# Patient Record
(demographics unavailable — no encounter records)

---

## 2025-03-04 NOTE — PHYSICAL EXAM
[Non-Tender] : non-tender [Smooth] : smooth [General Appearance - Alert] : alert [General Appearance - In No Acute Distress] : in no acute distress [General Appearance - Well Nourished] : well nourished [General Appearance - Well Developed] : well developed [General Appearance - Well-Appearing] : healthy appearing [Sclera] : the sclera and conjunctiva were normal [Hearing Threshold Finger Rub Not [place]] : hearing was normal [Oropharynx] : the oropharynx was normal [Neck Appearance] : the appearance of the neck was normal [Neck Cervical Mass (___cm)] : no neck mass was observed [Jugular Venous Distention Increased] : there was no jugular-venous distention [Respiration, Rhythm And Depth] : normal respiratory rhythm and effort [Exaggerated Use Of Accessory Muscles For Inspiration] : no accessory muscle use [Auscultation Breath Sounds / Voice Sounds] : lungs were clear to auscultation bilaterally [Heart Rate And Rhythm] : heart rate was normal and rhythm regular [Heart Sounds] : normal S1 and S2 [Murmurs] : no murmurs [Edema] : there was no peripheral edema [Bowel Sounds] : normal bowel sounds [Abdomen Soft] : soft [Abdomen Tenderness] : non-tender [Abdomen Mass (___ Cm)] : no abdominal mass palpated [Abnormal Walk] : normal gait [Nail Clubbing] : no clubbing  or cyanosis of the fingernails [Involuntary Movements] : no involuntary movements were seen [Skin Color & Pigmentation] : normal skin color and pigmentation [Skin Turgor] : normal skin turgor [] : no rash [Oriented To Time, Place, And Person] : oriented to person, place, and time [Impaired Insight] : insight and judgment were intact [Affect] : the affect was normal [Mood] : the mood was normal [Memory Recent] : recent memory was not impaired [Memory Remote] : remote memory was not impaired [Scleral Icterus] : No Scleral Icterus [Spider Angioma] : No spider angioma(s) were observed [Abdominal  Ascites] : no ascites [Splenomegaly] : no splenomegaly [Caput Medusae] : no caput medusae observed [Asterixis] : no asterixis observed [Jaundice] : No jaundice [Palmar Erythema] : no Palmar Erythema [FreeTextEntry1] : +mild central adiposity

## 2025-03-04 NOTE — HISTORY OF PRESENT ILLNESS
[de-identified] : Ms. Will is a 56 yo Yi-speaking F with osteoporosis (on Prolia q6 months) and PBC (first diagnosed in 2016 based on abnormal liver chemistries and positive AMA, with no prior liver biopsy), who is being seen for follow-up. She has been on ursodiol 500 mg po bid since 9/2016. Her symptoms include fatigue as well as pruritus, the latter partially improved after she started ursodiol. She previously underwent US elastography (1/23/17) that was concerning for advanced hepatic fibrosis, with median hepatic stiffness 1.79 m/s, but subsequent FibroScans (most recently on 7/5/24) have all suggested F0-1 fibrosis. She has no definite history of cirrhosis, portal hypertension, or overt hepatic decompensations.  FibroScan (7/5/24): Median liver stiffness 8.6 kPA (consistent with F1 fibrosis) and CAP score 237 dB/m (consistent with S0 steatosis).  She was last seen in this office on 1/22/24 and presents today for routine follow up and to discuss her recent FibroScan results (above). She is accompanied by her son who is assisting with translating, per Ms. Will's preference. She reports feeling well no new health complaints. She reports adherence with ursodiol 500 mg po bid and continues to tolerate it well. She is planning to visit [place] this summer with her daughter.   She is  and lives at home with her . They have 4 grown children, including a daughter who is an anesthesiologist at [employer] (after recently completing anesthesiology residency in 6/2024) and a son who is now a second-year resident in family medicine at [employer]. Her daughter recently  an IR resident in 9/2023. Her  is a , and their other two children are civil engineers. No tobacco, alcohol, or illicit drugs. They are originally from [place].  [FreeTextEntry1] : Ms. Will is a very pleasant 57 yo Romanian-speaking F with osteoporosis (on Prolia q6 months) and PBC (first diagnosed in 2016 based on abnormal liver chemistries and positive AMA, with no prior liver biopsy), who is being seen for follow-up. She has been on ursodiol 500 mg po bid since 9/2016. She previously underwent US elastography (1/23/17) that was concerning for advanced hepatic fibrosis, with median hepatic stiffness 1.79 m/s, but subsequent FibroScans (most recently on 7/5/24) have all suggested F0-1 fibrosis. She has no definite history of cirrhosis, portal hypertension, or overt hepatic decompensations.  FibroScan (7/5/24): Median liver stiffness 8.6 kPA (consistent with F1 fibrosis) and CAP score 237 dB/m (consistent with S0 steatosis).  She was last seen in this office on 7/8/24 and presents today for routine follow up. She is accompanied by her son who is assisting with translating, per Ms. Will's preference. She reports feeling well no new health complaints, though fatigued at times and also with occasional nighttime pruritus. She is awakened sometimes by the pruritus and sometimes by a need to urinate, though this is not nightly. She is not interested in pharmacologic therapy for pruritus yet. She has noticed dry mouth and increased thirst. No dry eyes. She reports adherence with ursodiol 500 mg po bid and continues to tolerate it well. She is planning to do labs today in our office.   She is  and lives at home with her . They have 4 grown children, including a daughter who is an anesthesiologist at [employer], son-in-law who is an IR resident, and a son who is now a second-year resident in family medicine at [employer]. Her  is a , and their other two children are civil engineers (including her son who is here today). No tobacco, alcohol, or illicit drugs. They are originally from [place].

## 2025-03-04 NOTE — HISTORY OF PRESENT ILLNESS
[de-identified] : Ms. Will is a 58 yo Mongolian-speaking F with osteoporosis (on Prolia q6 months) and PBC (first diagnosed in 2016 based on abnormal liver chemistries and positive AMA, with no prior liver biopsy), who is being seen for follow-up. She has been on ursodiol 500 mg po bid since 9/2016. Her symptoms include fatigue as well as pruritus, the latter partially improved after she started ursodiol. She previously underwent US elastography (1/23/17) that was concerning for advanced hepatic fibrosis, with median hepatic stiffness 1.79 m/s, but subsequent FibroScans (most recently on 7/5/24) have all suggested F0-1 fibrosis. She has no definite history of cirrhosis, portal hypertension, or overt hepatic decompensations.  FibroScan (7/5/24): Median liver stiffness 8.6 kPA (consistent with F1 fibrosis) and CAP score 237 dB/m (consistent with S0 steatosis).  She was last seen in this office on 1/22/24 and presents today for routine follow up and to discuss her recent FibroScan results (above). She is accompanied by her son who is assisting with translating, per Ms. Will's preference. She reports feeling well no new health complaints. She reports adherence with ursodiol 500 mg po bid and continues to tolerate it well. She is planning to visit [place] this summer with her daughter.   She is  and lives at home with her . They have 4 grown children, including a daughter who is an anesthesiologist at [employer] (after recently completing anesthesiology residency in 6/2024) and a son who is now a second-year resident in family medicine at [employer]. Her daughter recently  an IR resident in 9/2023. Her  is a , and their other two children are civil engineers. No tobacco, alcohol, or illicit drugs. They are originally from [place].  [FreeTextEntry1] : Ms. Will is a very pleasant 57 yo Setsw[place]-speaking F with osteoporosis (on Prolia q6 months) and PBC (first diagnosed in 2016 based on abnormal liver chemistries and positive AMA, with no prior liver biopsy), who is being seen for follow-up. She has been on ursodiol 500 mg po bid since 9/2016. She previously underwent US elastography (1/23/17) that was concerning for advanced hepatic fibrosis, with median hepatic stiffness 1.79 m/s, but subsequent FibroScans (most recently on 7/5/24) have all suggested F0-1 fibrosis. She has no definite history of cirrhosis, portal hypertension, or overt hepatic decompensations.  FibroScan (7/5/24): Median liver stiffness 8.6 kPA (consistent with F1 fibrosis) and CAP score 237 dB/m (consistent with S0 steatosis).  She was last seen in this office on 7/8/24 and presents today for routine follow up. She is accompanied by her son who is assisting with translating, per Ms. Will's preference. She reports feeling well no new health complaints, though fatigued at times and also with occasional nighttime pruritus. She is awakened sometimes by the pruritus and sometimes by a need to urinate, though this is not nightly. She is not interested in pharmacologic therapy for pruritus yet. She has noticed dry mouth and increased thirst. No dry eyes. She reports adherence with ursodiol 500 mg po bid and continues to tolerate it well. She is planning to do labs today in our office.   She is  and lives at home with her . They have 4 grown children, including a daughter who is an anesthesiologist at [employer], son-in-law who is an IR resident, and a son who is now a second-year resident in family medicine at [employer]. Her  is a , and their other two children are civil engineers (including her son who is here today). No tobacco, alcohol, or illicit drugs. They are originally from [place].

## 2025-03-04 NOTE — ASSESSMENT
[FreeTextEntry1] : # Primary biliary cholangitis (PBC): - Based on her most recent labs (7/2024), she has mild residual elevations in ALP and GGT that have not normalized despite long-term ursodiol therapy. We will re-check labs today for trends and monitor every 6 months. - We had previously opted to defer obeticholic acid therapy given that her cholestatic liver enzyme elevations are only mild and given the potential risk for worsening pruritus. Today, we discussed the possibility of adding a newer PBC medication (i.e., seladelpar or elafibrinor) that may, in combination with ursodiol, better control her PBC activity and potentially lead to normalization of her liver enzymes and lower long-term risks of cirrhosis or liver cancer. We discussed potential side effects and harms of these alternatives. She wishes to further discuss these medications with her family members who are physicians before deciding whether or not to start one (if also approved by insurance if/once ordered).  - Continue ursodiol 500 mg po bid (appropriate weight-based dose). - Given prior variable US elastography versus FibroScan results, MR elastography was previously ordered but was not approved by her insurance. Her most recent FibroScan (7/5/24) again suggests only mild (F1) fibrosis, overall stable from prior. We discussed deferring a liver biopsy at this time for definitive staging as it is unlikely to change her current pharmacologic management. We will plan to repeat FibroScan annually (due with next visit). - There was prior concern for a 6 mm hypervascular segment 4A nodule on prior MRI (1/16/23), LR-3 (indeterminate). However, follow up MRI (5/24/23) did not show any hypervascular nodule. She is therefore back on routine q6 month screening for hepatocellular carcinoma (HCC) with US and AFP. Her most recent ultrasound (11/2/24) did not show any focal hepatic lesion, though was again read as concerning for cirrhotic liver morphology. We have discussed that, while it is not possible to definitely exclude cirrhosis without a liver biopsy, nodularity can also sometimes be seen with non-cirrhotic PBC. Last AFP remained normal (7/2024). Re-check AFP today for trend. Next ultrasound due in 5/2025 and ordered today.  - We discussed that even if she has cirrhosis, she currently has well-compensated disease with preserved liver function based on her last labs and no overt complications of portal hypertension, and therefore she does not have any indication for liver transplantation evaluation at this time. We discussed the potential risk of progressing to decompensated cirrhosis (approximately 5%/year risk if she remains abstinent from alcohol) and potential risks of development of ascites, SBP or other infections, varices / variceal hemorrhage, hepatic encephalopathy, PVT, and HCC and alert signs for her to seek medical attention. - Given normal platelet count >150k and median liver stiffness by FibroScan of <20 kPa, she does not need EGD screening for varices or initiation of carvedilol. We will continue to monitor labs and elastography, as above. - Continue to monitor TSH and lipid panel annually, ordered with today's labs. - Continue to monitor for fat-soluble vitamin deficiencies annually, ordered with today's labs.  # Pruritus: - Due to worsening intermittent nighttime pruritus, she was offered a trial of cholestyramine today with potential benefits, risks, and side effects reviewed. She declined therapy for now. We also discussed that if she is intolerant of cholestyramine in future, there are other off label medications we can try such as sertraline, naltrexone, or rifampin.  # Osteoporosis: - Continue treatment with Prolia q6 months. - She is following for this with a rheumatologist and reports that she had a repeat DEXA ~5/2023 that showed improvement with osteopenia instead of osteoporosis and that she is due to repeat it in 5/2025 (to be ordered then by her rheumatologist).  # Health maintenance: - HAV and HBV immune. - Last colonoscopy in 2017, reportedly normal. - Given concern for possible cirrhosis, Ms. GRIFFIN was counseled to: abstain from alcohol and all illicit drugs; avoid use of herbal and dietary supplements due to potential hepatotoxicity; limit use of acetaminophen to <2 grams per day; avoid use of nonsteroidal antiinflammatory drugs (NSAIDs) as these can lead to diuretic resistance and precipitate renal dysfunction in patients with advanced liver disease; avoid eating any unpasteurized dairy products; avoid eating any raw or undercooked eggs, fish, poultry, or meat; and avoid eating raw/steamed oysters or other shellfish to avoid risk of Vibrio infection.  Next follow-up: 6 months with same-day FibroScan

## 2025-03-04 NOTE — REASON FOR VISIT
[Follow-Up: _____] : a [unfilled] follow-up visit [Other: _____] : [unfilled] [Patient Declined  Services] : - None: Patient declined  services [Interpreters_Relationshiptopatient] : Son [TWNoteComboBox1] : [place]

## 2025-03-04 NOTE — REVIEW OF SYSTEMS
[Negative] : Heme/Lymph [Feeling Tired] : feeling tired [As Noted in HPI] : as noted in HPI [Itching] : itching [FreeTextEntry4] : +dry mouth

## 2025-03-04 NOTE — PHYSICAL EXAM
[Non-Tender] : non-tender [Smooth] : smooth [General Appearance - Alert] : alert [General Appearance - In No Acute Distress] : in no acute distress [General Appearance - Well Nourished] : well nourished [General Appearance - Well Developed] : well developed [General Appearance - Well-Appearing] : healthy appearing [Sclera] : the sclera and conjunctiva were normal [Hearing Threshold Finger Rub Not [place]] : hearing was normal [Oropharynx] : the oropharynx was normal [Neck Appearance] : the appearance of the neck was normal [Neck Cervical Mass (___cm)] : no neck mass was observed [Jugular Venous Distention Increased] : there was no jugular-venous distention [Respiration, Rhythm And Depth] : normal respiratory rhythm and effort [Exaggerated Use Of Accessory Muscles For Inspiration] : no accessory muscle use [Auscultation Breath Sounds / Voice Sounds] : lungs were clear to auscultation bilaterally [Heart Rate And Rhythm] : heart rate was normal and rhythm regular [Heart Sounds] : normal S1 and S2 [Murmurs] : no murmurs [Edema] : there was no peripheral edema [Bowel Sounds] : normal bowel sounds [Abdomen Soft] : soft [Abdomen Tenderness] : non-tender [Abdomen Mass (___ Cm)] : no abdominal mass palpated [Abnormal Walk] : normal gait [Nail Clubbing] : no clubbing  or cyanosis of the fingernails [Involuntary Movements] : no involuntary movements were seen [Skin Color & Pigmentation] : normal skin color and pigmentation [Skin Turgor] : normal skin turgor [] : no rash [Oriented To Time, Place, And Person] : oriented to person, place, and time [Impaired Insight] : insight and judgment were intact [Affect] : the affect was normal [Mood] : the mood was normal [Memory Recent] : recent memory was not impaired [Memory Remote] : remote memory was not impaired [Scleral Icterus] : No Scleral Icterus [Spider Angioma] : No spider angioma(s) were observed [Abdominal  Ascites] : no ascites [Splenomegaly] : no splenomegaly [Caput Medusae] : no caput medusae observed [Asterixis] : no asterixis observed [Jaundice] : No jaundice [Palmar Erythema] : no Palmar Erythema [FreeTextEntry1] : +mild central adiposity